# Patient Record
(demographics unavailable — no encounter records)

---

## 2025-05-13 NOTE — HISTORY OF PRESENT ILLNESS
[FreeTextEntry1] : 76F RHD with PMHx of HTN coming into the office with new complaint of right long trigger finger. She says that she wakes up in the middle of the night with her finger stuck in flexion and mild-moderate pain. She reports that it is catching/ getting stuck like her other finger used to. She denies any numbness or tingling of her hand.  Of note she was treated for left ring trigger finger with 3x steroid injection (last injection 3/2022), since she had complete symptom relief.

## 2025-05-13 NOTE — PHYSICAL EXAM
[de-identified] : Physical exam demonstrates the patient to be alert and oriented x 3 and capable of ambulation. The patient is well-developed and well-nourished in no apparent respiratory distress. The majority of the skin is intact bilaterally in the upper extremities without any bilateral elbow lymphadenopathy.  Full, symmetric digital range of motion.  Tender to palpation over the right long finger A1 pulley, locking can be appreciated.  Nontender over the MCP joint/collateral ligament.  No collateral ligament instability or extensor tendon subluxation.  Flexor and extensor tendons intact.  Sensation is intact to light touch.  All fingers are well-perfused.

## 2025-05-13 NOTE — PROCEDURE
[FreeTextEntry1] : My impression is that the patient has stenosing tenosynovitis of the right long finger. We discussed treatment options and she elected to undergo a trigger finger injection. The risks, benefits, and alternatives were discussed with the patient. This included, but was not limited to nerve injury, infection, subcutaneous atrophy, skin depigmentation etc. Under informed consent and sterile conditions the flexor tendon sheath at the A1 pulley was injected with a combination of 1/2 cc Kenalog-10 and 1/2 cc of 2% plain lidocaine. It is my hopes that this significantly alleviates the patients symptoms.

## 2025-05-13 NOTE — ASSESSMENT
[FreeTextEntry1] : My impression is that the patient has a right long finger trigger finger. Initial treatment options were discussed shared decision-making was made to proceed with a corticosteroid injection into the flexor tendon sheath today. I explained that recurrence of symptoms is not uncommon. If this were to occur, consideration for another injection will be made. I did note that multiple, repeated injections become less efficacious over time and eventually, surgery should be considered. All questions were answered. The patient was well in accordance with the plan. I noted that it may take 1-2 weeks for the steroid to take effect. The patient will follow up with me on an as-needed basis.